# Patient Record
(demographics unavailable — no encounter records)

---

## 2024-10-23 NOTE — ASSESSMENT
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  However has fatty liver  Patient is seen today for follow up Overall doing well s/p blood donation x 1 since last appt Labs reviewed analyzed and discussed Goal ferritin< 500 ng/mL and iron saturation below 45% Donation x 1 before next appt Plan donation Q6m long term Advised to be uptodate with age appropriate cancer screening  Family history of cancer He has a family history of ovarian cancer in his mother and breast cancer in his grandmother. Unaware if anyone had genetic testing Refer to genetics  Patient had multiple questions which were answered to satisfaction  Labs in 4-6 months.  CBC, CMP, iron studies, ferritin, GGT, AFP OV few days later

## 2024-10-23 NOTE — CONSULT LETTER
[Dear  ___] : Dear  [unfilled], [Consult Letter:] : I had the pleasure of evaluating your patient, [unfilled]. [Please see my note below.] : Please see my note below. [Consult Closing:] : Thank you very much for allowing me to participate in the care of this patient.  If you have any questions, please do not hesitate to contact me. [Sincerely,] : Sincerely, [FreeTextEntry3] : Alban Elise MD, MPH  of Medicine [facility] at [facility] Attending Physician  Hematology and Medical Oncology [facility]

## 2024-10-23 NOTE — HISTORY OF PRESENT ILLNESS
[de-identified] : Mr. Fredrick Suarez is 57 years old male with heterozygous hemochromatosis here for consultation, referred by Dr. Weston Spicer  Patient with hx of depression and anxiety and HTN who had annual check up with Dr. Spicer with elevated ferritin and heterozygous hemochromatosis Diet - eating beef about twice a week Used to drink a bottle wine per day at night - up to until April 2024. Patient reports of heavy snoring at night.  4/17/2024  Ferritin - 722 2/16/2024 - 523  H/H 14.9/44 MCV 83.7  Hemochromatosis - heterozygous C282Y  FHx: Mother (Camryn Suarez) with ovary cancer in her 70s - [facility] Dr. Lewis   [facility] - Used to drink a bottle wine per day at night - up to until April 2024. Denies any illicit drugs  Works as   Colonoscopy - scheduled for June 2024 (never had one)  He get light headed here and there.   MRI abdomen [6/5/2024]: No iron overload.  Mild hepatic steatosis [de-identified] : Patient is seen today for follow up He is s/p blood donation x 1 since last appt Did not notice any diff in his symptoms

## 2024-10-23 NOTE — CONSULT LETTER
[Dear  ___] : Dear  [unfilled], [Consult Letter:] : I had the pleasure of evaluating your patient, [unfilled]. [Please see my note below.] : Please see my note below. [Consult Closing:] : Thank you very much for allowing me to participate in the care of this patient.  If you have any questions, please do not hesitate to contact me. [Sincerely,] : Sincerely, [FreeTextEntry3] : Alban Elise MD, MPH  of Medicine [facility] at [facility] Attending Physician  Hematology and Medical Oncology [facility]

## 2024-10-23 NOTE — HISTORY OF PRESENT ILLNESS
[de-identified] : Mr. Fredrick Suarez is 57 years old male with heterozygous hemochromatosis here for consultation, referred by Dr. Weston Spicer  Patient with hx of depression and anxiety and HTN who had annual check up with Dr. Spicer with elevated ferritin and heterozygous hemochromatosis Diet - eating beef about twice a week Used to drink a bottle wine per day at night - up to until April 2024. Patient reports of heavy snoring at night.  4/17/2024  Ferritin - 722 2/16/2024 - 523  H/H 14.9/44 MCV 83.7  Hemochromatosis - heterozygous C282Y  FHx: Mother (Camryn Suarez) with ovary cancer in her 70s - seen by Dr. Lewis   [facility] - Used to drink a bottle wine per day at night - up to until April 2024. Denies any illicit drugs  Works as   Colonoscopy - scheduled for June 2024 (never had one)  He get light headed here and there.   MRI abdomen [6/5/2024]: No iron overload.  Mild hepatic steatosis [de-identified] : Patient is seen today for follow up He is s/p blood donation x 1 since last appt Did not notice any diff in his symptoms

## 2024-10-31 NOTE — ASSESSMENT
[FreeTextEntry1] : 58 yo male with h/o HTN, HLD here to establish cardiac care. ASCVD risk of 9.6% over 10 years; moderately elevated risk   #Abnormal EKG stress, dyspnea on exertion --TTE WNL --nuclear stress test - no perfusion abnormalities; 2mm ST depressions with exercise --will obtain CCTA given intermediate CV risk --continued risk factor modification emphasized  #HTN; goal BP<130/80 --BP WNL today; continue losartan, chlorthalidone and up-titrate dose as tolerated --close home monitoring emphasized --low salt diet --increase exercise as tolerated  #HLD --sub-optimally controlled lipids --ASCVD risk moderately elevated --strict lifestyle changes emphasized; heart healthy diet and increased exercise --will benefit from statin if not responsive to lifestyle changes in 3-6 months  Close follow up in 3 months; pending above testing

## 2024-10-31 NOTE — HISTORY OF PRESENT ILLNESS
[FreeTextEntry1] : 58 yo male with h/o HTN, HLD here for follow up cardiac care. 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 Denies any significant palpitations, dyspnea, LE edema, PND or syncope with mild exertion. Also notes episodes of dizziness, occur rarely about 4-5 times per year Never smoker FMH of cardiac disease including uncontrolled HTN, HLD and mitral valve prolapse  EKG today NSR with RBBB and Twi in anterior leads HbA1C 5.4% Lipid panel: Tchol 247, HDL 62, [id number], non  Nuclear stress test (with exercise): no perfusion abnormalities; 10.1 METs, target HR achieved, 100% MPHR achieved. 1 mm ST depressions in V4-6, 2 mm ST depressions in II, III, F developed during stress, partially improved during recovery. Post stress left ventricular EF is 63 %. TTE: normal LV size/function. LVEF 62%, normal RV size/function, no sig valvular abnormalities

## 2024-11-05 NOTE — ASSESSMENT
[FreeTextEntry1] : Assessment:  Excessive Daytime Sleepiness (EDS): The patient presents with excessive daytime sleepiness, likely related to untreated obstructive sleep apnea (ESTELLA), given his loud snoring, witnessed apneas, and elevated STOPBANG score of 6. His [place] Sleepiness Scale score of 10 suggests mild sleepiness. Obstructive Sleep Apnea (ESTELLA): The patient has multiple risk factors for ESTELLA, including a neck circumference of 19 inches, loud snoring, witnessed apneas, and a high STOPBANG score.   Plan:  Order overnight polysomnography (PSG) to formally evaluate for ESTELLA and determine severity. Review the results to guide further management and potential CPAP titration. CPAP Therapy: Instruct patient to bring in his old CPAP machine after the sleep study. If ESTELLA is confirmed, adjust CPAP settings as needed based on the study results. Consider replacing the old machine if it is outdated or malfunctioning.  Encourage weight loss, as this may improve ESTELLA symptoms. Recommend avoiding alcohol and sedatives before bedtime, as they can exacerbate sleep apnea.

## 2024-11-05 NOTE — HISTORY OF PRESENT ILLNESS
[FreeTextEntry1] : 58 year old man with history of hemochromatosis, htn, hld is here in the sleep center to address excessive daytime sleepiness.  Patient is slight sleepy with [place] sleepiness score of 10.  Patient has very loud snoring which disturbs his wife, also has witnessed apneas.  Patient's bedtime is around 9 PM wakes up in the morning around 7 AM.   He feels tired when he wakes up.  Patient drinks 1-2 cups of coffee during the daytime. Patient does not have any headaches or nocturia. He is not sleepy while driving. STOPBANG score - 6 neck size - 19 inches Patient has a old cpap at home, asked him to bring with him after the sleep study. He never had a sleep study, but bought a cpap few yrs ago.

## 2025-01-27 NOTE — HISTORY OF PRESENT ILLNESS
[FreeTextEntry1] : 57 yo male with h/o HTN, HLD here for follow up cardiac care.  Recent dizziness in the am, positional, episodes last a few seconds until self-resolving. No cp or dyspnea, no palps.  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 He denies any chest pain, nausea, palpitations at the time. Denies any significant palpitations, dyspnea, LE edema, PND or syncope with mild exertion. Also notes episodes of dizziness, occur rarely about 4-5 times per year Never smoker. FMH of cardiac disease including uncontrolled HTN, HLD and mitral valve prolapse  Here for follow up after recent testing. He stopped taking crestor 2 weeks ago due to generalized fatigue and back pain. He has been exercising daily, gets his HR up to 130s (avg 110s) daily for 30-40 minutes without limiting symptoms BP running low - on ARB and chlorthalidone  Cardiac Workup:  EKG today NSR with RBBB and Twi in anterior leads HbA1C 5.4% Lipid panel: Tchol 247, HDL 62, [id number], non  Nuclear stress test (with exercise) 04/2024: no perfusion abnormalities; 10.1 METs, target HR achieved, 100% MPHR achieved. 1 mm ST depressions in V4-6, 2 mm ST depressions in II, III, F developed during stress, partially improved during recovery. Post stress left ventricular EF is 63 %. TTE: normal LV size/function. LVEF 62%, normal RV size/function, no sig valvular abnormalities CCTA 07/2024: CAC score of 0, minimal CAD in the RCA

## 2025-01-27 NOTE — ASSESSMENT
[FreeTextEntry1] : 57 yo male with h/o HTN, HLD here to establish cardiac care. ASCVD risk of 9.6% over 10 years; moderately elevated risk   #Abnormal EKG stress, dyspnea on exertion --no sig CAD on CCTA --continued risk factor modification emphasized  #HTN; goal BP<130/80 --BP on the low side; rec half dose chlorthalidone vs dc and to monitor BP closely --continue ARB --BP check in 2 weeks --close home monitoring emphasized --low salt diet --continued exercise as tolerated  #HLD --sub-optimally controlled lipids; stopped taking crestor - could not tolerate --will repeat lipids and CPK --to consider switching to PCSK9-Is due to statin intolerance vs alt statin --strict lifestyle changes emphasized; heart healthy diet and increased exercise  Follow up in 3-6 months

## 2025-04-23 NOTE — CONSULT LETTER
[Dear  ___] : Dear  [unfilled], [Consult Letter:] : I had the pleasure of evaluating your patient, [unfilled]. [Please see my note below.] : Please see my note below. [Consult Closing:] : Thank you very much for allowing me to participate in the care of this patient.  If you have any questions, please do not hesitate to contact me. [Sincerely,] : Sincerely, [FreeTextEntry3] : Alban Elise MD, MPH  of Medicine [facility] at [facility] Attending Physician  Hematology and Medical Oncology [facility]

## 2025-04-23 NOTE — HISTORY OF PRESENT ILLNESS
[de-identified] : Mr. Fredrick Suarez is 57 years old male with heterozygous hemochromatosis here for consultation, referred by Dr. Weston Spicer  Patient with hx of depression and anxiety and HTN who had annual check up with Dr. Spicer with elevated ferritin and heterozygous hemochromatosis Diet - eating beef about twice a week Used to drink a bottle wine per day at night - up to until April 2024. Patient reports of heavy snoring at night.  4/17/2024  Ferritin - 722 2/16/2024 - 523  H/H 14.9/44 MCV 83.7  Hemochromatosis - heterozygous C282Y  FHx: Mother (Camryn Suarez) with ovary cancer in her 70s - [facility] Dr. Lewis   [facility] - Used to drink a bottle wine per day at night - up to until April 2024. Denies any illicit drugs  Works as   Colonoscopy - scheduled for June 2024 (never had one)  He get light headed here and there.   MRI abdomen [6/5/2024]: No iron overload.  Mild hepatic steatosis [de-identified] : Patient is seen today for follow up He is s/p blood donation x 1 since last appt Did not notice any diff in his symptoms

## 2025-05-01 NOTE — CONSULT LETTER
[Dear  ___] : Dear  [unfilled], [Consult Letter:] : I had the pleasure of evaluating your patient, [unfilled]. [Please see my note below.] : Please see my note below. [Consult Closing:] : Thank you very much for allowing me to participate in the care of this patient.  If you have any questions, please do not hesitate to contact me. [Sincerely,] : Sincerely, [FreeTextEntry3] : Alban Elise MD, MPH  of Medicine [facility] at [facility] Attending Physician  Hematology and Medical Oncology [facility]

## 2025-05-01 NOTE — ASSESSMENT
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  However has fatty liver  Patient is seen today for follow up Overall doing well s/p blood donation x 1 since last appt Labs reviewed analyzed and discussed Goal ferritin< 500 ng/mL and iron saturation below 45% Donation x 1 before next appt Plan donation Q6m long term Monitor AFP- normal but slightly higher. Repeat in 3 months If AFP stable, can check it q6m Advised to be uptodate with age appropriate cancer screening  Family history of cancer He has a family history of ovarian cancer in his mother and breast cancer in his grandmother. Unaware if anyone had genetic testing Refer to genetics  Patient had multiple questions which were answered to satisfaction  Labs in 3 months.  CBC, CMP, iron studies, ferritin, GGT, AFP OV few days later

## 2025-05-01 NOTE — HISTORY OF PRESENT ILLNESS
[de-identified] : Mr. Fredrick Suarez is 57 years old male with heterozygous hemochromatosis here for consultation, referred by Dr. Weston Spicer  Patient with hx of depression and anxiety and HTN who had annual check up with Dr. Spicer with elevated ferritin and heterozygous hemochromatosis Diet - eating beef about twice a week Used to drink a bottle wine per day at night - up to until April 2024. Patient reports of heavy snoring at night.  4/17/2024  Ferritin - 722 2/16/2024 - 523  H/H 14.9/44 MCV 83.7  Hemochromatosis - heterozygous C282Y  FHx: Mother (Camryn Suarez) with ovary cancer in her 70s - seen by Dr. Lewis   [facility] - Used to drink a bottle wine per day at night - up to until April 2024. Denies any illicit drugs  Works as   Colonoscopy - scheduled for June 2024 (never had one)  He get light headed here and there.   MRI abdomen [6/5/2024]: No iron overload.  Mild hepatic steatosis [de-identified] : Patient is seen today for follow up He is s/p blood donation x 1 since last appt- Oct 25, 2024 Did not notice any diff in his symptoms Overall feels slightly better with the blood donation program.  He has cut down on alcohol.

## 2025-05-01 NOTE — CONSULT LETTER
[Dear  ___] : Dear  [unfilled], [Consult Letter:] : I had the pleasure of evaluating your patient, [unfilled]. [Please see my note below.] : Please see my note below. [Consult Closing:] : Thank you very much for allowing me to participate in the care of this patient.  If you have any questions, please do not hesitate to contact me. [Sincerely,] : Sincerely, [FreeTextEntry3] : Alban Elise MD, MPH  of Medicine [facility] at [facility] Attending Physician  Hematology and Medical Oncology [facility]

## 2025-05-01 NOTE — HISTORY OF PRESENT ILLNESS
[de-identified] : Mr. Fredrick Suarez is 57 years old male with heterozygous hemochromatosis here for consultation, referred by Dr. Weston Spicer  Patient with hx of depression and anxiety and HTN who had annual check up with Dr. Spicer with elevated ferritin and heterozygous hemochromatosis Diet - eating beef about twice a week Used to drink a bottle wine per day at night - up to until April 2024. Patient reports of heavy snoring at night.  4/17/2024  Ferritin - 722 2/16/2024 - 523  H/H 14.9/44 MCV 83.7  Hemochromatosis - heterozygous C282Y  FHx: Mother (Camryn Suarez) with ovary cancer in her 70s - [facility] Dr[facility]   [facility] - Used to drink a bottle wine per day at night - up to until April 2024. Denies any illicit drugs  Works as   Colonoscopy - scheduled for June 2024 (never had one)  He get light headed here and there.   MRI abdomen [6/5/2024]: No iron overload.  Mild hepatic steatosis [de-identified] : Patient is seen today for follow up He is s/p blood donation x 1 since last appt- Oct 25, 2024 Did not notice any diff in his symptoms Overall feels slightly better with the blood donation program.  He has cut down on alcohol.

## 2025-05-06 NOTE — ASSESSMENT
[FreeTextEntry1] : 59 yo male with h/o HTN, HLD here for follow up cardiac care.  #ASCVD - minimal CAD on CCTA, non-obstructive - ASA if able to tolerate - continue statin - continued CV risk factor modification - Cardiovascular risk factors discussed  #HTN; goal BP<130/80 - borderline elevated --continue ARB (losartan 100mg); d/c chlorthalidone --restart amlodipine --close home monitoring emphasized --low salt diet --continued exercise as tolerated  #HLD, goal LDL <70 --continue crestor 10mg - improved lipids in 01/2025 - Continue current prescribed medications - Low fat low cholesterol diet emphasized - Heart healthy, Mediterranean diet discussed - Increase exercise as tolerated  Follow up in 6-12 months

## 2025-05-06 NOTE — PHYSICAL EXAM
[No Gallop] : no gallop [Normal Bowel Sounds] : normal bowel sounds [No Varicosities] : no varicosities [Well Developed] : well developed [Well Nourished] : well nourished [No Acute Distress] : no acute distress [Normal Conjunctiva] : normal conjunctiva [Normal Venous Pressure] : normal venous pressure [No Carotid Bruit] : no carotid bruit [Normal S1, S2] : normal S1, S2 [No Murmur] : no murmur [No Rub] : no rub [Clear Lung Fields] : clear lung fields [Good Air Entry] : good air entry [No Respiratory Distress] : no respiratory distress  [Soft] : abdomen soft [Non Tender] : non-tender [Normal Gait] : normal gait [No Edema] : no edema [No Cyanosis] : no cyanosis [No Rash] : no rash [No Skin Lesions] : no skin lesions [Moves all extremities] : moves all extremities [Normal Speech] : normal speech [Alert and Oriented] : alert and oriented [Normal memory] : normal memory

## 2025-05-06 NOTE — ASSESSMENT
[FreeTextEntry1] : 57 yo male with h/o HTN, HLD here for follow up cardiac care.  #ASCVD - minimal CAD on CCTA, non-obstructive - ASA if able to tolerate - continue statin - continued CV risk factor modification - Cardiovascular risk factors discussed  #HTN; goal BP<130/80 - borderline elevated --continue ARB (losartan 100mg); d/c chlorthalidone --restart amlodipine --close home monitoring emphasized --low salt diet --continued exercise as tolerated  #HLD, goal LDL <70 --continue crestor 10mg - improved lipids in 01/2025 - Continue current prescribed medications - Low fat low cholesterol diet emphasized - Heart healthy, Mediterranean diet discussed - Increase exercise as tolerated  Follow up in 6-12 months

## 2025-05-06 NOTE — HISTORY OF PRESENT ILLNESS
[FreeTextEntry1] : 57 yo male with h/o HTN, HLD here for follow up cardiac care. Previously on chlorthalidone for BP - d/c'ed due to dizziness. Currently on amlod 2.5, losart 100, and crestor 10mg BP elevated today; notes that he ran out of amlodipine over the last 2 days. Denies any significant palpitations, chest discomfort, dyspnea, LE edema, PND or dizziness/syncope with mild exertion. Never smoker. FMH of cardiac disease including uncontrolled HTN, HLD and mitral valve prolapse  Cardiac Workup:  EKG NSR with RBBB and Twi in anterior leads HbA1C 5.4% Lipid panel: Tchol 247, HDL 62, ZPS113, non  Nuclear stress test (with exercise) 04/2024: no perfusion abnormalities; 10.1 METs, target HR achieved, 100% MPHR achieved. 1 mm ST depressions in V4-6, 2 mm ST depressions in II, III, F developed during stress, partially improved during recovery. Post stress left ventricular EF is 63 %. TTE: normal LV size/function. LVEF 62%, normal RV size/function, no sig valvular abnormalities CCTA 07/2024: CAC score of 0, minimal CAD in the RCA

## 2025-05-06 NOTE — HISTORY OF PRESENT ILLNESS
[FreeTextEntry1] : 59 yo male with h/o HTN, HLD here for follow up cardiac care. Previously on chlorthalidone for BP - d/c'ed due to dizziness. Currently on amlod 2.5, losart 100, and crestor 10mg BP elevated today; notes that he ran out of amlodipine over the last 2 days. Denies any significant palpitations, chest discomfort, dyspnea, LE edema, PND or dizziness/syncope with mild exertion. Never smoker. FMH of cardiac disease including uncontrolled HTN, HLD and mitral valve prolapse  Cardiac Workup:  EKG NSR with RBBB and Twi in anterior leads HbA1C 5.4% Lipid panel: Tchol 247, HDL 62, [id number], non  Nuclear stress test (with exercise) 04/2024: no perfusion abnormalities; 10.1 METs, target HR achieved, 100% MPHR achieved. 1 mm ST depressions in V4-6, 2 mm ST depressions in II, III, F developed during stress, partially improved during recovery. Post stress left ventricular EF is 63 %. TTE: normal LV size/function. LVEF 62%, normal RV size/function, no sig valvular abnormalities CCTA 07/2024: CAC score of 0, minimal CAD in the RCA